# Patient Record
Sex: FEMALE | Race: WHITE | NOT HISPANIC OR LATINO | Employment: UNEMPLOYED | URBAN - METROPOLITAN AREA
[De-identification: names, ages, dates, MRNs, and addresses within clinical notes are randomized per-mention and may not be internally consistent; named-entity substitution may affect disease eponyms.]

---

## 2023-11-30 ENCOUNTER — ANCILLARY PROCEDURE (OUTPATIENT)
Dept: RADIOLOGY | Facility: CLINIC | Age: 58
End: 2023-11-30
Payer: COMMERCIAL

## 2023-11-30 DIAGNOSIS — Z12.31 ENCOUNTER FOR SCREENING MAMMOGRAM FOR MALIGNANT NEOPLASM OF BREAST: ICD-10-CM

## 2023-11-30 PROCEDURE — 77063 BREAST TOMOSYNTHESIS BI: CPT | Performed by: RADIOLOGY

## 2023-11-30 PROCEDURE — 77063 BREAST TOMOSYNTHESIS BI: CPT

## 2023-11-30 PROCEDURE — 77067 SCR MAMMO BI INCL CAD: CPT | Performed by: RADIOLOGY

## 2024-12-02 ENCOUNTER — HOSPITAL ENCOUNTER (OUTPATIENT)
Dept: RADIOLOGY | Facility: CLINIC | Age: 59
Discharge: HOME | End: 2024-12-02
Payer: COMMERCIAL

## 2024-12-02 VITALS — HEIGHT: 64 IN | WEIGHT: 158.07 LBS | BODY MASS INDEX: 26.99 KG/M2

## 2024-12-02 DIAGNOSIS — Z12.31 ENCOUNTER FOR SCREENING MAMMOGRAM FOR MALIGNANT NEOPLASM OF BREAST: ICD-10-CM

## 2024-12-02 PROCEDURE — 77067 SCR MAMMO BI INCL CAD: CPT

## 2024-12-02 PROCEDURE — 77063 BREAST TOMOSYNTHESIS BI: CPT | Performed by: STUDENT IN AN ORGANIZED HEALTH CARE EDUCATION/TRAINING PROGRAM

## 2024-12-02 PROCEDURE — 77067 SCR MAMMO BI INCL CAD: CPT | Performed by: STUDENT IN AN ORGANIZED HEALTH CARE EDUCATION/TRAINING PROGRAM

## 2024-12-17 ENCOUNTER — APPOINTMENT (OUTPATIENT)
Dept: RADIOLOGY | Facility: CLINIC | Age: 59
End: 2024-12-17
Payer: COMMERCIAL

## 2025-03-11 ENCOUNTER — TELEPHONE (OUTPATIENT)
Facility: CLINIC | Age: 60
End: 2025-03-11
Payer: COMMERCIAL

## 2025-03-11 NOTE — TELEPHONE ENCOUNTER
Stools are loose ok to set up colonoscopy----- Message from Ilsa De La Fuente sent at 3/10/2025  1:18 PM EDT -----  She can schedule colonoscopy.  If very watery Bms then let me know and I will also order stool studies.  If just loose then proceed with colonoscopy.  THX!  ----- Message -----  From: Nabila Thompson MA  Sent: 3/10/2025   1:14 PM EDT  To: Ilsa De La Fuente MD    Patient has been having loose stools for about 5 weeks thinks she's overdue for a colonoscopy and not sure if she should set it up or needs an appointment to see you first

## 2025-03-13 DIAGNOSIS — Z12.11 SPECIAL SCREENING FOR MALIGNANT NEOPLASMS, COLON: ICD-10-CM

## 2025-03-13 RX ORDER — POLYETHYLENE GLYCOL 3350, SODIUM SULFATE ANHYDROUS, SODIUM BICARBONATE, SODIUM CHLORIDE, POTASSIUM CHLORIDE 236; 22.74; 6.74; 5.86; 2.97 G/4L; G/4L; G/4L; G/4L; G/4L
4 POWDER, FOR SOLUTION ORAL ONCE
Qty: 4000 ML | Refills: 0 | Status: SHIPPED | OUTPATIENT
Start: 2025-03-13 | End: 2025-03-13

## 2025-03-21 ENCOUNTER — LAB REQUISITION (OUTPATIENT)
Dept: LAB | Facility: HOSPITAL | Age: 60
End: 2025-03-21
Payer: COMMERCIAL

## 2025-03-21 ENCOUNTER — APPOINTMENT (OUTPATIENT)
Dept: GASTROENTEROLOGY | Facility: EXTERNAL LOCATION | Age: 60
End: 2025-03-21
Payer: COMMERCIAL

## 2025-03-21 DIAGNOSIS — R19.7 DIARRHEA, UNSPECIFIED TYPE: Primary | ICD-10-CM

## 2025-03-21 DIAGNOSIS — R19.5 LOOSE STOOLS: ICD-10-CM

## 2025-03-21 PROCEDURE — 0753T DGTZ GLS MCRSCP SLD LEVEL IV: CPT

## 2025-03-21 PROCEDURE — 88305 TISSUE EXAM BY PATHOLOGIST: CPT | Performed by: PATHOLOGY

## 2025-03-21 PROCEDURE — 45380 COLONOSCOPY AND BIOPSY: CPT | Performed by: INTERNAL MEDICINE

## 2025-03-21 PROCEDURE — 88305 TISSUE EXAM BY PATHOLOGIST: CPT

## 2025-03-25 ENCOUNTER — TELEPHONE (OUTPATIENT)
Dept: GASTROENTEROLOGY | Facility: CLINIC | Age: 60
End: 2025-03-25
Payer: COMMERCIAL

## 2025-03-25 DIAGNOSIS — K58.0 IRRITABLE BOWEL SYNDROME WITH DIARRHEA: Primary | ICD-10-CM

## 2025-03-25 NOTE — TELEPHONE ENCOUNTER
I called patient she states that diarrhea symptoms are ongoing.  Recent colonoscopy endoscopically normal and awaiting biopsy for microscopic colitis.  History of diarrhea-predominant IBS responded to Xifaxan in the past.  Will place her on 2-week therapy of Xifaxan given her prior response.  She is in agreement with the plan.

## 2025-03-31 DIAGNOSIS — K52.832 LYMPHOCYTIC COLITIS: Primary | ICD-10-CM

## 2025-03-31 LAB
LABORATORY COMMENT REPORT: NORMAL
PATH REPORT.COMMENTS IMP SPEC: NORMAL
PATH REPORT.FINAL DX SPEC: NORMAL
PATH REPORT.GROSS SPEC: NORMAL
PATH REPORT.RELEVANT HX SPEC: NORMAL
PATH REPORT.TOTAL CANCER: NORMAL

## 2025-03-31 RX ORDER — BUDESONIDE 3 MG/1
CAPSULE, COATED PELLETS ORAL
Qty: 126 CAPSULE | Refills: 0 | Status: SHIPPED | OUTPATIENT
Start: 2025-03-31 | End: 2025-05-26

## 2025-03-31 NOTE — PROGRESS NOTES
I called patient discussed with her biopsies showed lymphocytic colitis.  Continuing to have daily diarrhea symptoms.  Not improved with Xifaxan after a week.  Will stop Xifaxan and start budesonide 9 mg daily for 4 weeks then 6 mg daily for 2 weeks then 3 mg daily for 2 weeks.  She will see me back in the office within about 6 weeks.  She is in agreement with the plan.

## 2025-04-07 ENCOUNTER — APPOINTMENT (OUTPATIENT)
Dept: GASTROENTEROLOGY | Facility: EXTERNAL LOCATION | Age: 60
End: 2025-04-07
Payer: COMMERCIAL

## 2025-04-18 ENCOUNTER — APPOINTMENT (OUTPATIENT)
Dept: GASTROENTEROLOGY | Facility: EXTERNAL LOCATION | Age: 60
End: 2025-04-18
Payer: COMMERCIAL

## 2025-05-09 RX ORDER — CHOLECALCIFEROL (VITAMIN D3) 50 MCG
2 TABLET ORAL DAILY
COMMUNITY

## 2025-05-09 RX ORDER — MULTIVITAMIN WITH IRON
1 TABLET ORAL DAILY
COMMUNITY

## 2025-05-09 RX ORDER — METFORMIN HYDROCHLORIDE 500 MG/1
1000 TABLET, EXTENDED RELEASE ORAL 2 TIMES DAILY
COMMUNITY
Start: 2025-02-04

## 2025-05-12 ENCOUNTER — APPOINTMENT (OUTPATIENT)
Dept: GASTROENTEROLOGY | Facility: CLINIC | Age: 60
End: 2025-05-12
Payer: COMMERCIAL

## 2025-05-12 VITALS — BODY MASS INDEX: 26.63 KG/M2 | HEIGHT: 64 IN | WEIGHT: 156 LBS | HEART RATE: 72 BPM

## 2025-05-12 DIAGNOSIS — R19.7 DIARRHEA, UNSPECIFIED TYPE: ICD-10-CM

## 2025-05-12 DIAGNOSIS — K52.832 LYMPHOCYTIC COLITIS: Primary | ICD-10-CM

## 2025-05-12 PROCEDURE — 99213 OFFICE O/P EST LOW 20 MIN: CPT | Performed by: INTERNAL MEDICINE

## 2025-05-12 PROCEDURE — 3008F BODY MASS INDEX DOCD: CPT | Performed by: INTERNAL MEDICINE

## 2025-05-12 PROCEDURE — 1036F TOBACCO NON-USER: CPT | Performed by: INTERNAL MEDICINE

## 2025-05-12 NOTE — PROGRESS NOTES
REASON FOR VISIT: Follow-up lymphocytic colitis    HPI:  Jaclyn Corea is a 60 y.o. female here to follow-up on recent diagnosis of lymphocytic colitis.  Started budesonide about 5 weeks ago.  Almost completed 2 pills a day therapy going down to 1 budesonide daily.  Much improved diarrhea.  Having 1-2 mostly formed bowel movements a day.  Had some baseline symptoms of occasional loose stools prior to her lymphocytic colitis.  Not on any medications outside budesonide currently.  Does not take NSAIDs currently.        [facility] END[facility]COPY    PAST MEDICAL HISTORY  Medical History[1]    PAST SURGICAL HISTORY  Surgical History[2]    FAMILY HISTORY  Family History[3]    SOCIAL HISTORY  Social History     Tobacco Use    Smoking status: Never    Smokeless tobacco: Never   Substance Use Topics    Alcohol use: Not on file       REVIEW OF SYSTEMS  CONSTITUTIONAL: negative for fever, chills, fatigue, or unintentional weight loss,   HEENT: negative for icteric sclera, eye pain/redness, or changes in vision/hearing  RESPIRATORY: negative for cough, hemoptysis, wheezing, orthopnea, or dyspnea on exertion  CARDIOVASCULAR: negative for chest pain, palpitations, or syncope   GASTROINTESTINAL: as noted per HPI  GENITOURINARY: negative for dysuria, polyuria, incontinence, or hematuria  MUSCULOSKELETAL: negative for arthralgia, myalgia, or joint swelling/stiffness   INTEGUMENTARY/SKIN: negative jaundice, rash, or skin lesion  HEMATOLOGIC/LYMPHATIC: negative for prolonged bleeding, easy bruising, or swollen lymph nodes  ENDOCRINE: negative for cold/heat intolerance, polydipsia, polyuria, or goiter  NEUROLOGIC: negative for headaches, dizziness, tremor, or gait abnormality  PSYCHIATRIC: negative for anxiety, depression, personality changes, or sleep disturbances      A 10 point review of systems was completed and was otherwise negative.    ALLERGIES  Allergies[4]    MEDICATIONS  Current Medications[5]    VITALS  Pulse 72   Ht 1.626 m (5'  "4\")   Wt 70.8 kg (156 lb)   BMI 26.78 kg/m²      PHYSICAL EXAM  Alert and oriented in no acute distress    ASSESSMENT/ PLAN  Patient with history of diarrhea with recent diagnosis of lymphocytic colitis.  Off any medications except budesonide taper at this point.  Will complete budesonide in the next 14 days or so.  Has had significant improvement.  I did discuss with her the underlying IBS with diarrhea symptoms that are intermittent in the background.  Recommended trial of low FODMAP diet.  She will see me otherwise back in about 2 months to reassess symptoms.  She is in agreement with the plan.        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  [4] No Known Allergies  [5]   Current Outpatient Medications   Medication Sig Dispense Refill    budesonide EC (Entocort EC) 3 mg 24 hr capsule Take 3 capsules (9 mg) by mouth once daily in the morning for 28 days, THEN 2 capsules (6 mg) once daily in the morning for 14 days, THEN 1 capsule (3 mg) once daily in the morning for 14 days. 126 capsule 0    cholecalciferol (Vitamin D-3) 50 mcg (2,000 units) tablet Take 2 tablets (100 mcg) by mouth once daily.      multivitamin (Multiple Vitamins) tablet Take 1 tablet by mouth once daily.      metFORMIN  mg 24 hr tablet Take 2 " tablets (1,000 mg) by mouth twice a day. (Patient not taking: Reported on 5/12/2025)       No current facility-administered medications for this visit.

## 2025-06-27 ENCOUNTER — APPOINTMENT (OUTPATIENT)
Dept: GASTROENTEROLOGY | Facility: CLINIC | Age: 60
End: 2025-06-27
Payer: COMMERCIAL

## 2025-07-23 ENCOUNTER — OFFICE VISIT (OUTPATIENT)
Dept: GASTROENTEROLOGY | Facility: CLINIC | Age: 60
End: 2025-07-23
Payer: COMMERCIAL

## 2025-07-23 DIAGNOSIS — K52.832 LYMPHOCYTIC COLITIS: Primary | ICD-10-CM

## 2025-07-23 DIAGNOSIS — R19.7 DIARRHEA, UNSPECIFIED TYPE: ICD-10-CM

## 2025-07-23 PROCEDURE — 99214 OFFICE O/P EST MOD 30 MIN: CPT | Performed by: INTERNAL MEDICINE

## 2025-07-23 RX ORDER — BUDESONIDE 3 MG/1
9 CAPSULE, COATED PELLETS ORAL EVERY MORNING
Qty: 270 CAPSULE | Refills: 1 | Status: SHIPPED | OUTPATIENT
Start: 2025-07-23 | End: 2026-01-19

## 2025-07-23 NOTE — PROGRESS NOTES
Visit performed via televideo connection    REASON FOR VISIT: Follow-up diarrhea    HPI:  Jaclyn Corea is a 60 y.o. female with a past medical history of lymphocytic colitis being evaluated for follow-up.  Budesonide was doing rather well 2 months ago with down to 1-2 formed bowel movements a day.  Since being off budesonide has had regular loose stools.  Has background of sarcoidosis and has been off Remicade now for several months.  The symptoms seem to start after being off Remicade.  On colonoscopy otherwise no inflammatory findings found endoscopically but biopsies showed evidence of lymphocytic colitis.  No prior celiac serologies in our system.  Has not had rectal bleeding.  Imodium seems to slow things down but had to take 2-4 daily for benefit.          [facility]COPY    PAST MEDICAL HISTORY  Medical History[1]    PAST SURGICAL HISTORY  Surgical History[2]    FAMILY HISTORY  Family History[3]    SOCIAL HISTORY  Social History     Tobacco Use    Smoking status: Never    Smokeless tobacco: Never   Substance Use Topics    Alcohol use: Not on file       REVIEW OF SYSTEMS      A 10 point review of systems was completed and was otherwise negative.    ALLERGIES  Allergies[4]    MEDICATIONS  Current Medications[5]    VITALS  There were no vitals taken for this visit.     PHYSICAL EXAM  In no acute distress    ASSESSMENT/ PLAN  Patient with lymphocytic colitis and recurrence of diarrhea as she tapered off her budesonide.  Has underlying sarcoidosis/autoimmune activity.  May be autoimmune related lymphocytic colitis especially considering symptoms started after coming off her Remicade.  Will place back on budesonide which seem to help and have restarted 3 pills (9 mg total) daily and if doing well in 3 weeks to decrease to 2 pills daily and maintain that at this point.  I will check celiac serologies.  She is being considered to go back on Remicade which I discussed with her may actually benefit her diarrhea  symptoms as well.  I asked her to let me know about her symptom response over the next few weeks.  She is in agreement with the plan.        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  [4] No Known Allergies  [5]   Current Outpatient Medications   Medication Sig Dispense Refill    budesonide EC (Entocort EC) 3 mg 24 hr capsule Take 3 capsules (9 mg) by mouth once daily in the morning for 28 days, THEN 2 capsules (6 mg) once daily in the morning for 14 days, THEN 1 capsule (3 mg) once daily in the morning for 14 days. 126 capsule 0    budesonide EC (Entocort EC) 3 mg 24 hr capsule Take 3 capsules (9 mg) by mouth once daily in the morning. 270 capsule 1    cholecalciferol (Vitamin D-3) 50 mcg (2,000 units) tablet Take 2 tablets (100 mcg) by mouth once daily.      metFORMIN  mg 24 hr tablet Take 2 tablets (1,000 mg) by mouth twice a day. (Patient not taking: Reported on 5/12/2025)      multivitamin (Multiple Vitamins) tablet Take 1 tablet by mouth once daily.       No current facility-administered medications for this visit.

## 2025-08-12 LAB
GLIADIN IGA SER IA-ACNC: <1 U/ML
GLIADIN IGG SER IA-ACNC: <1 U/ML
IGA SERPL-MCNC: 317 MG/DL (ref 47–310)
TTG IGA SER-ACNC: <1 U/ML
TTG IGG SER-ACNC: 1 U/ML

## 2025-08-13 ENCOUNTER — TELEPHONE (OUTPATIENT)
Dept: GASTROENTEROLOGY | Facility: CLINIC | Age: 60
End: 2025-08-13
Payer: COMMERCIAL

## 2025-08-13 DIAGNOSIS — R19.7 DIARRHEA, UNSPECIFIED TYPE: Primary | ICD-10-CM

## 2025-08-13 RX ORDER — CHOLESTYRAMINE 4 G/9G
4 POWDER, FOR SUSPENSION ORAL DAILY
Qty: 30 PACKET | Refills: 1 | Status: SHIPPED | OUTPATIENT
Start: 2025-08-13 | End: 2025-10-12